# Patient Record
Sex: FEMALE | Race: AMERICAN INDIAN OR ALASKA NATIVE
[De-identification: names, ages, dates, MRNs, and addresses within clinical notes are randomized per-mention and may not be internally consistent; named-entity substitution may affect disease eponyms.]

---

## 2022-05-18 ENCOUNTER — HOSPITAL ENCOUNTER (EMERGENCY)
Dept: HOSPITAL 5 - ED | Age: 57
LOS: 1 days | Discharge: HOME | End: 2022-05-19
Payer: MEDICAID

## 2022-05-18 DIAGNOSIS — E11.9: ICD-10-CM

## 2022-05-18 DIAGNOSIS — Z86.73: ICD-10-CM

## 2022-05-18 DIAGNOSIS — K59.00: ICD-10-CM

## 2022-05-18 DIAGNOSIS — I10: ICD-10-CM

## 2022-05-18 DIAGNOSIS — R10.13: Primary | ICD-10-CM

## 2022-05-18 DIAGNOSIS — N28.9: ICD-10-CM

## 2022-05-18 DIAGNOSIS — K21.9: ICD-10-CM

## 2022-05-18 LAB
ALBUMIN SERPL-MCNC: 3.3 G/DL (ref 3.9–5)
ALT SERPL-CCNC: 10 UNITS/L (ref 7–56)
BASOPHILS # (AUTO): 0 K/MM3 (ref 0–0.1)
BASOPHILS NFR BLD AUTO: 0.4 % (ref 0–1.8)
BUN SERPL-MCNC: 17 MG/DL (ref 7–17)
BUN/CREAT SERPL: 5 %
CALCIUM SERPL-MCNC: 11 MG/DL (ref 8.4–10.2)
EOSINOPHIL # BLD AUTO: 0.1 K/MM3 (ref 0–0.4)
EOSINOPHIL NFR BLD AUTO: 0.9 % (ref 0–4.3)
HCT VFR BLD CALC: 42 % (ref 30.3–42.9)
HEMOLYSIS INDEX: 45
HGB BLD-MCNC: 13.7 GM/DL (ref 10.1–14.3)
LYMPHOCYTES # BLD AUTO: 0.9 K/MM3 (ref 1.2–5.4)
LYMPHOCYTES NFR BLD AUTO: 12.8 % (ref 13.4–35)
MCHC RBC AUTO-ENTMCNC: 33 % (ref 30–34)
MCV RBC AUTO: 98 FL (ref 79–97)
MONOCYTES # (AUTO): 0.5 K/MM3 (ref 0–0.8)
MONOCYTES % (AUTO): 7.2 % (ref 0–7.3)
PLATELET # BLD: 170 K/MM3 (ref 140–440)
RBC # BLD AUTO: 4.29 M/MM3 (ref 3.65–5.03)

## 2022-05-18 PROCEDURE — 71045 X-RAY EXAM CHEST 1 VIEW: CPT

## 2022-05-18 PROCEDURE — 80053 COMPREHEN METABOLIC PANEL: CPT

## 2022-05-18 PROCEDURE — 74176 CT ABD & PELVIS W/O CONTRAST: CPT

## 2022-05-18 PROCEDURE — 36415 COLL VENOUS BLD VENIPUNCTURE: CPT

## 2022-05-18 PROCEDURE — 85025 COMPLETE CBC W/AUTO DIFF WBC: CPT

## 2022-05-18 PROCEDURE — 83690 ASSAY OF LIPASE: CPT

## 2022-05-18 PROCEDURE — 99285 EMERGENCY DEPT VISIT HI MDM: CPT

## 2022-05-18 NOTE — CAT SCAN REPORT
CT ABDOMEN AND PELVIS WITHOUT CONTRAST



INDICATION / CLINICAL INFORMATION: Epigastric pain, n/v, constipation.



TECHNIQUE: Axial CT images were obtained through the abdomen and pelvis without IV contrast.  All CT 
scans at this location are performed using CT dose reduction for ALARA by means of automated exposure
 control. 



COMPARISON: None available.



FINDINGS:



LOWER CHEST: Bibasilar volume loss.

LIVER: No significant abnormality

GALLBLADDER/BILIARY TREE: No significant abnormality  

PANCREAS: No significant abnormality

SPLEEN: No significant abnormality

ADRENALS: No significant abnormality

RIGHT KIDNEY / URETER: Punctate nonobstructive right renal stone. No urolithiasis or hydronephrosis.

LEFT KIDNEY / URETER: No significant abnormality

URINARY BLADDER: No significant abnormality

REPRODUCTIVE ORGANS: No significant abnormality

STOMACH / BOWEL: Stomach and small bowel are normal in caliber. No evidence of localized bowel inflam
mation or obstruction. Moderate stool in the colon with moderate distention of the rectal vault. Mild
 presacral/perirectal stranding without significant mural thickening. Appendix is not visualized, lik
ely surgically absent.

LYMPH NODES: No significant adenopathy.

VASCULATURE: Abdominal aorta is normal in caliber. No acute abnormality. IVC filter. 

OTHER: Fat-containing umbilical hernia without complication. No free air, free fluid, or focal fluid 
collection.



SKELETAL SYSTEM: Degenerative changes of the spine. No acute osseous findings.



IMPRESSION:



1. Moderate constipation with rectal fecal impaction. There is mild presacral/perirectal fat strandin
g, which could reflect mild proctitis.

2. No other acute findings of the abdomen or pelvis.

3. Additional chronic and incidental findings as above.



Signer Name: Surinder Simmons MD 

Signed: 5/18/2022 10:33 PM

Workstation Name: [id number]-

## 2022-05-18 NOTE — EMERGENCY DEPARTMENT REPORT
HPI





- General


Chief Complaint: Abdominal Pain


Time Seen by Provider: 22 21:23





- HPI


HPI: 





Room 24








Patient is a 56-year-old female present with a chief complaint of abdominal pain

and constipation.  Patient states she has had epigastric abdominal pain 

intermittently for 1 month but worsened today.  Patient describes the pain as 

"feeling as though I have to go to the bathroom."  Patient states she has been 

constipated for 1 month.  Patient states she has not passed stool in 1 month.  

Patient admits to nausea vomiting intermittently.  Patient admits to subjective 

fever.





ED Past Medical Hx





- Past Medical History


Hx Hypertension: Yes


Hx CVA: Yes


Hx Diabetes: Yes


Hx GERD: Yes


Hx Renal Disease: Yes (ESRD HD q. MWF)





- Surgical History


Additional Surgical History: Right chest Vas-Cath





- Family History


Family history: no significant





- Social History


Smoking Status: Never Smoker


Substance Use Type: None





- Medications


Home Medications: 


                                Home Medications











 Medication  Instructions  Recorded  Confirmed  Last Taken  Type


 


Docusate Sodium [Colace] 100 mg PO BID #60 capsule 22  Unknown Rx


 


Lactulose [Cephulac] 20 gm PO QDAY PRN #90 ml 22  Unknown Rx














ED Review of Systems


ROS: 


Stated complaint: WEAKNESS FROM DIALYSIS


Other details as noted in HPI





Constitutional: fever


Eyes: denies: eye pain


ENT: denies: throat pain


Respiratory: cough


Cardiovascular: denies: chest pain


Endocrine: no symptoms reported


Gastrointestinal: abdominal pain, nausea, vomiting, constipation


Genitourinary: denies: abnormal menses


Musculoskeletal: denies: back pain


Neurological: denies: headache





Physical Exam





- Physical Exam


Vital Signs: 


                                   Vital Signs











  22





  20:59


 


Temperature 97.8 F


 


Pulse Rate 91 H


 


Respiratory 19





Rate 


 


Blood Pressure 133/72


 


O2 Sat by Pulse 99





Oximetry 











Physical Exam: 





GENERAL: The patient is well-developed well-nourished female lying on stretcher 

not appearing to be in acute distress. []


HEENT: Normocephalic.  Atraumatic.  Extraocular motions are intact.  Patient has

 moist mucous membranes.


NECK: Supple.  Trachea midline


CHEST/LUNGS: Clear to auscultation.  There is no respiratory distress noted.  

Occasional cough


HEART/CARDIOVASCULAR: Regular.  There is no tachycardia.  There is no gallop rub

 or murmur.


ABDOMEN: Abdomen is soft, nontender.  Patient has normal bowel sounds.  There is

 no abdominal distention.


SKIN: There is no rash.  There is no edema.  There is no diaphoresis.


NEURO: The patient is awake, alert, and oriented.  The patient is cooperative.  

The patient has normal speech.  GCS 15


MUSCULOSKELETAL: There is no evidence of acute injury.





ED Course


                                   Vital Signs











  22





  20:59


 


Temperature 97.8 F


 


Pulse Rate 91 H


 


Respiratory 19





Rate 


 


Blood Pressure 133/72


 


O2 Sat by Pulse 99





Oximetry 














- Reevaluation(s)


Reevaluation #1: 





22 03:06


Patient improved after large bowel movement in the ED





ED Medical Decision Making





- Lab Data


Result diagrams: 


                                 22 21:38





                                 22 21:38





                                Laboratory Tests











  22





  21:38 21:38


 


WBC  7.2 


 


RBC  4.29 


 


Hgb  13.7 


 


Hct  42.0 


 


MCV  98 H 


 


MCH  32 


 


MCHC  33 


 


RDW  15.6 H 


 


Plt Count  170 


 


Lymph % (Auto)  12.8 L 


 


Mono % (Auto)  7.2 


 


Eos % (Auto)  0.9 


 


Baso % (Auto)  0.4 


 


Lymph # (Auto)  0.9 L 


 


Mono # (Auto)  0.5 


 


Eos # (Auto)  0.1 


 


Baso # (Auto)  0.0 


 


Seg Neutrophils %  78.7 H 


 


Seg Neutrophils #  5.6 


 


Sodium   134 L


 


Potassium   3.9


 


Chloride   96.2 L


 


Carbon Dioxide   21 L


 


Anion Gap   21


 


BUN   17


 


Creatinine   3.3 H


 


Estimated GFR   15


 


BUN/Creatinine Ratio   5


 


Glucose   141 H


 


Calcium   11.0 H


 


Total Bilirubin   0.50


 


AST   16


 


ALT   10


 


Alkaline Phosphatase   59


 


Total Protein   7.7


 


Albumin   3.3 L


 


Albumin/Globulin Ratio   0.8


 


Lipase   27














- Radiology Data


Radiology results: report reviewed (CT abdomen pelvis), image reviewed (CT 

abdomen pelvis)





[facility] [address] [place] [address] Cat

 Scan Report Signed Patient: KISHAN KRISHNAN MR#: [id number] : 1965

 Acct:[id number] Age/Sex: 56 / F ADM Date: 22 Loc: ED Attending Dr: 

Ordering Physician: WILLIAM ORELLANA MD Date of Service: 22 Procedure(s): CT 

abdomen pelvis wo con Accession Number(s): [id number] cc: WILLIAM ORELLANA MD CT 

ABDOMEN AND PELVIS WITHOUT CONTRAST INDICATION / CLINICAL INFORMATION: 

Epigastric pain, n/v, constipation. TECHNIQUE: Axial CT images were obtained 

through the abdomen and pelvis without IV contrast. All CT scans at this 

location are performed using CT dose reduction for ALARA by means of automated 

exposure control. COMPARISON: None available. FINDINGS: LOWER CHEST: Bibasilar 

volume loss. LIVER: No significant abnormality GALLBLADDER/BILIARY TREE: No 

significant abnormality PANCREAS: No significant abnormality SPLEEN: No signif

icant abnormality ADRENALS: No significant abnormality RIGHT KIDNEY / URETER: 

Punctate nonobstructive right renal stone. No urolithiasis or hydronephrosis. 

LEFT KIDNEY / URETER: No significant abnormality URINARY BLADDER: No significant

 abnormality REPRODUCTIVE ORGANS: No significant abnormality STOMACH / BOWEL: 

Stomach and small bowel are normal in caliber. No evidence of localized bowel 

inflammation or obstruction. Moderate stool in the colon with moderate 

distention of the rectal vault. Mild presacral/perirectal stranding without 

significant mural thickening. Appendix is not visualized, likely surgically 

absent. LYMPH NODES: No significant adenopathy. VASCULATURE: Abdominal aorta is 

normal in caliber. No acute abnormality. IVC filter. OTHER: Fat-containing 

umbilical hernia without complication. No free air, free fluid, or focal fluid 

collection. SKELETAL SYSTEM: Degenerative changes of the spine. No acute osseous

 findings. IMPRESSION: 1. Moderate constipation with rectal fecal impaction. 

There is mild presacral/perirectal fat stranding, which could reflect mild 

proctitis. 2. No other acute findings of the abdomen or pelvis. 3. Additional 

chronic and incidental findings as above. Signer Name: Landon Simmons MD 

Signed: 2022 10:33 PM Workstation Name: [email or web address]- Transcribed By: RADHA 

Dictated By: LANDON SIMMONS MD Electronically Authenticated By: LANDON SIMMONS MD Signed Date/Time: 22 DD/DT: 22 TD/TT:





- Differential Diagnosis


Constipation, fecal impaction, small bowel obstruction


Critical care attestation.: 


If time is entered above; I have spent that time in minutes in the direct care 

of this critically ill patient, excluding procedure time.








ED Disposition


Clinical Impression: 


 Acute abdominal pain, Constipation





Disposition: 01 HOME / SELF CARE / HOMELESS


Is pt being admited?: No


Does the pt Need Aspirin: No


Condition: Stable


Instructions:  Abdominal Pain (ED), Constipation, Adult, Easy-to-Read


Additional Instructions: 


Return to the emergency department should you develop worsening symptoms, 

inability to tolerate food or liquids, high fever or any other concerns


Prescriptions: 


Lactulose [Cephulac] 20 gm PO QDAY PRN #90 ml


 PRN Reason: Constipation


Docusate Sodium [Colace] 100 mg PO BID #60 capsule


Referrals: 


DAMON PARSONS MD [Staff Physician] - 3-5 Days (Dr. Parsons is a 

gastroenterologist.  Please follow-up with him for further evaluation)


Time of Disposition: 03:08

## 2022-05-18 NOTE — XRAY REPORT
CHEST 1 VIEW 5/18/2022 9:45 PM



INDICATION / CLINICAL INFORMATION: Cough.



COMPARISON: None available.



FINDINGS:



SUPPORT DEVICES: Right PermCath is present with the tip projecting over the cavoatrial junction.

HEART / MEDIASTINUM: No significant abnormality. 

LUNGS / PLEURA: No significant pulmonary or pleural abnormality. No pneumothorax. 



ADDITIONAL FINDINGS: No significant additional findings.



IMPRESSION:

1. No acute findings.



Signer Name: SRAVANTHI Turcios MD 

Signed: 5/18/2022 10:08 PM

Workstation Name: [id number]

## 2022-05-19 ENCOUNTER — P2P PATIENT RECORD (OUTPATIENT)
Age: 57
End: 2022-05-19

## 2022-05-19 VITALS — SYSTOLIC BLOOD PRESSURE: 113 MMHG | DIASTOLIC BLOOD PRESSURE: 70 MMHG

## 2022-05-19 RX ADMIN — LACTULOSE ONE GM: 20 SOLUTION ORAL at 00:22
